# Patient Record
Sex: MALE | Race: WHITE | NOT HISPANIC OR LATINO | URBAN - METROPOLITAN AREA
[De-identification: names, ages, dates, MRNs, and addresses within clinical notes are randomized per-mention and may not be internally consistent; named-entity substitution may affect disease eponyms.]

---
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
## 2023-10-03 ENCOUNTER — NON-APPOINTMENT (OUTPATIENT)
Age: 18
End: 2023-10-03

## 2023-11-05 ENCOUNTER — NON-APPOINTMENT (OUTPATIENT)
Age: 18
End: 2023-11-05

## 2024-10-21 ENCOUNTER — NON-APPOINTMENT (OUTPATIENT)
Age: 19
End: 2024-10-21

## 2025-03-11 ENCOUNTER — INPATIENT (INPATIENT)
Facility: HOSPITAL | Age: 20
LOS: 3 days | Discharge: HOME | DRG: 143 | End: 2025-03-15
Attending: THORACIC SURGERY (CARDIOTHORACIC VASCULAR SURGERY) | Admitting: INTERNAL MEDICINE
Payer: MEDICAID

## 2025-03-11 VITALS
HEIGHT: 69 IN | RESPIRATION RATE: 20 BRPM | DIASTOLIC BLOOD PRESSURE: 74 MMHG | WEIGHT: 158.07 LBS | SYSTOLIC BLOOD PRESSURE: 120 MMHG | HEART RATE: 69 BPM | TEMPERATURE: 98 F | OXYGEN SATURATION: 97 %

## 2025-03-11 DIAGNOSIS — J93.9 PNEUMOTHORAX, UNSPECIFIED: ICD-10-CM

## 2025-03-11 DIAGNOSIS — Z98.890 OTHER SPECIFIED POSTPROCEDURAL STATES: Chronic | ICD-10-CM

## 2025-03-11 LAB
ALBUMIN SERPL ELPH-MCNC: 5.3 G/DL — HIGH (ref 3.5–5.2)
ALP SERPL-CCNC: 73 U/L — SIGNIFICANT CHANGE UP (ref 30–115)
ALT FLD-CCNC: 12 U/L — SIGNIFICANT CHANGE UP (ref 0–41)
ANION GAP SERPL CALC-SCNC: 13 MMOL/L — SIGNIFICANT CHANGE UP (ref 7–14)
APTT BLD: 32.6 SEC — SIGNIFICANT CHANGE UP (ref 27–39.2)
AST SERPL-CCNC: 17 U/L — SIGNIFICANT CHANGE UP (ref 0–41)
BILIRUB SERPL-MCNC: 0.5 MG/DL — SIGNIFICANT CHANGE UP (ref 0.2–1.2)
BUN SERPL-MCNC: 13 MG/DL — SIGNIFICANT CHANGE UP (ref 10–20)
CALCIUM SERPL-MCNC: 10.1 MG/DL — SIGNIFICANT CHANGE UP (ref 8.4–10.5)
CHLORIDE SERPL-SCNC: 103 MMOL/L — SIGNIFICANT CHANGE UP (ref 98–110)
CO2 SERPL-SCNC: 27 MMOL/L — SIGNIFICANT CHANGE UP (ref 17–32)
CREAT SERPL-MCNC: 0.9 MG/DL — SIGNIFICANT CHANGE UP (ref 0.7–1.5)
EGFR: 125 ML/MIN/1.73M2 — SIGNIFICANT CHANGE UP
EGFR: 125 ML/MIN/1.73M2 — SIGNIFICANT CHANGE UP
GLUCOSE SERPL-MCNC: 96 MG/DL — SIGNIFICANT CHANGE UP (ref 70–99)
HCT VFR BLD CALC: 46.8 % — SIGNIFICANT CHANGE UP (ref 42–52)
HGB BLD-MCNC: 15.7 G/DL — SIGNIFICANT CHANGE UP (ref 14–18)
INR BLD: 1.03 RATIO — SIGNIFICANT CHANGE UP (ref 0.65–1.3)
LIDOCAIN IGE QN: 21 U/L — SIGNIFICANT CHANGE UP (ref 7–60)
MCHC RBC-ENTMCNC: 27.9 PG — SIGNIFICANT CHANGE UP (ref 27–31)
MCHC RBC-ENTMCNC: 33.5 G/DL — SIGNIFICANT CHANGE UP (ref 32–37)
MCV RBC AUTO: 83.3 FL — SIGNIFICANT CHANGE UP (ref 80–94)
NRBC BLD AUTO-RTO: 0 /100 WBCS — SIGNIFICANT CHANGE UP (ref 0–0)
PLATELET # BLD AUTO: 239 K/UL — SIGNIFICANT CHANGE UP (ref 130–400)
PMV BLD: 10.4 FL — SIGNIFICANT CHANGE UP (ref 7.4–10.4)
POTASSIUM SERPL-MCNC: 4.2 MMOL/L — SIGNIFICANT CHANGE UP (ref 3.5–5)
POTASSIUM SERPL-SCNC: 4.2 MMOL/L — SIGNIFICANT CHANGE UP (ref 3.5–5)
PROT SERPL-MCNC: 7.7 G/DL — SIGNIFICANT CHANGE UP (ref 6–8)
PROTHROM AB SERPL-ACNC: 12.2 SEC — SIGNIFICANT CHANGE UP (ref 9.95–12.87)
RBC # BLD: 5.62 M/UL — SIGNIFICANT CHANGE UP (ref 4.7–6.1)
RBC # FLD: 13.2 % — SIGNIFICANT CHANGE UP (ref 11.5–14.5)
SODIUM SERPL-SCNC: 143 MMOL/L — SIGNIFICANT CHANGE UP (ref 135–146)
WBC # BLD: 6.44 K/UL — SIGNIFICANT CHANGE UP (ref 4.8–10.8)
WBC # FLD AUTO: 6.44 K/UL — SIGNIFICANT CHANGE UP (ref 4.8–10.8)

## 2025-03-11 PROCEDURE — 71046 X-RAY EXAM CHEST 2 VIEWS: CPT | Mod: 26

## 2025-03-11 PROCEDURE — 93005 ELECTROCARDIOGRAM TRACING: CPT

## 2025-03-11 PROCEDURE — 36415 COLL VENOUS BLD VENIPUNCTURE: CPT

## 2025-03-11 PROCEDURE — 71045 X-RAY EXAM CHEST 1 VIEW: CPT | Mod: 26

## 2025-03-11 PROCEDURE — 83735 ASSAY OF MAGNESIUM: CPT

## 2025-03-11 PROCEDURE — 86900 BLOOD TYPING SEROLOGIC ABO: CPT

## 2025-03-11 PROCEDURE — 86850 RBC ANTIBODY SCREEN: CPT

## 2025-03-11 PROCEDURE — 85730 THROMBOPLASTIN TIME PARTIAL: CPT

## 2025-03-11 PROCEDURE — 86901 BLOOD TYPING SEROLOGIC RH(D): CPT

## 2025-03-11 PROCEDURE — 80048 BASIC METABOLIC PNL TOTAL CA: CPT

## 2025-03-11 PROCEDURE — 71250 CT THORAX DX C-: CPT | Mod: MC

## 2025-03-11 PROCEDURE — 93010 ELECTROCARDIOGRAM REPORT: CPT

## 2025-03-11 PROCEDURE — 84484 ASSAY OF TROPONIN QUANT: CPT

## 2025-03-11 PROCEDURE — 86140 C-REACTIVE PROTEIN: CPT

## 2025-03-11 PROCEDURE — 80053 COMPREHEN METABOLIC PANEL: CPT

## 2025-03-11 PROCEDURE — 99291 CRITICAL CARE FIRST HOUR: CPT | Mod: 25

## 2025-03-11 PROCEDURE — 84100 ASSAY OF PHOSPHORUS: CPT

## 2025-03-11 PROCEDURE — 85652 RBC SED RATE AUTOMATED: CPT

## 2025-03-11 PROCEDURE — 85025 COMPLETE CBC W/AUTO DIFF WBC: CPT

## 2025-03-11 PROCEDURE — 85027 COMPLETE CBC AUTOMATED: CPT

## 2025-03-11 PROCEDURE — 71045 X-RAY EXAM CHEST 1 VIEW: CPT

## 2025-03-11 PROCEDURE — 85610 PROTHROMBIN TIME: CPT

## 2025-03-11 PROCEDURE — 32556 INSERT CATH PLEURA W/O IMAGE: CPT | Mod: LT

## 2025-03-11 PROCEDURE — 99222 1ST HOSP IP/OBS MODERATE 55: CPT

## 2025-03-11 RX ORDER — INFLUENZA A VIRUS A/IDAHO/07/2018 (H1N1) ANTIGEN (MDCK CELL DERIVED, PROPIOLACTONE INACTIVATED, INFLUENZA A VIRUS A/INDIANA/08/2018 (H3N2) ANTIGEN (MDCK CELL DERIVED, PROPIOLACTONE INACTIVATED), INFLUENZA B VIRUS B/SINGAPORE/INFTT-16-0610/2016 ANTIGEN (MDCK CELL DERIVED, PROPIOLACTONE INACTIVATED), INFLUENZA B VIRUS B/IOWA/06/2017 ANTIGEN (MDCK CELL DERIVED, PROPIOLACTONE INACTIVATED) 15; 15; 15; 15 UG/.5ML; UG/.5ML; UG/.5ML; UG/.5ML
0.5 INJECTION, SUSPENSION INTRAMUSCULAR ONCE
Refills: 0 | Status: DISCONTINUED | OUTPATIENT
Start: 2025-03-11 | End: 2025-03-15

## 2025-03-11 RX ORDER — IBUPROFEN 200 MG
600 TABLET ORAL ONCE
Refills: 0 | Status: COMPLETED | OUTPATIENT
Start: 2025-03-11 | End: 2025-03-11

## 2025-03-11 RX ADMIN — Medication 600 MILLIGRAM(S): at 22:48

## 2025-03-11 RX ADMIN — Medication 4 MILLIGRAM(S): at 22:48

## 2025-03-11 RX ADMIN — Medication 600 MILLIGRAM(S): at 19:02

## 2025-03-11 NOTE — ED PROVIDER NOTE - OBJECTIVE STATEMENT
19-year-old male no PMHx presents today for left-sided chest pain, left neck pain, shortness of breath on exertion since 4 PM.  Patient states he woke up from his nap with sweating and felt the symptoms.  Patient states he works out every day had a chest workout yesterday and takes 200 Mg caffeine preworkout C4.  Patient denies headache, fevers, chills, nausea, vomiting.

## 2025-03-11 NOTE — ED ADULT NURSE NOTE - CAS EDN DISCHARGE ASSESSMENT
Madonna (wife) Alert and oriented to person, place and time/Awake/Symptoms improved/No adverse reaction to first time med in ED

## 2025-03-11 NOTE — ED ADULT NURSE REASSESSMENT NOTE - NS ED NURSE REASSESS COMMENT FT1
pt upgraded from ED main for pigtail placement. iv inserted; labs sent. consent in pt chart. pt aware of plan of care.

## 2025-03-11 NOTE — ED PROVIDER NOTE - PATIENT PORTAL LINK FT
You can access the FollowMyHealth Patient Portal offered by [facility] by registering at the following website: [email or web address]. By joining [employer]’s FollowMyHealth portal, you will also be able to view your health information using other applications (apps) compatible with our system.

## 2025-03-11 NOTE — ED PROVIDER NOTE - PHYSICAL EXAMINATION
VITAL SIGNS: I have reviewed nursing notes and confirm.  CONSTITUTIONAL: well-appearing, non-toxic, NAD  SKIN: Warm dry, normal skin turgor  HEAD: NCAT  EYES: EOMI, PERRLA, no scleral icterus  ENT: Moist mucous membranes, normal pharynx with no erythema or exudates  NECK: Supple; non tender. Full ROM. No cervical LAD  CARD: RRR, no murmurs, rubs or gallops  RESP: clear to ausculation b/l.  No rales, rhonchi, or wheezing. + productive cough  ABD: soft, + BS, non-tender, non-distended, no rebound or guarding. No CVA tenderness  EXT: Full ROM, no bony tenderness, no pedal edema, no calf tenderness  NEURO: normal motor. normal sensory.  PSYCH: Cooperative, appropriate.

## 2025-03-11 NOTE — ED PROVIDER NOTE - CLINICAL SUMMARY MEDICAL DECISION MAKING FREE TEXT BOX
19-year-old presented today with left-sided chest pain.  Patient is noted to have a pneumothorax.  Patient signed out to me pending pigtail placement. Pigtail placed.  Patient's lung expanded.  Patient admitted.    Attending Statement: I have personally provided the amount of critical care time documented below excluding time spent on separate procedures.     Critical Care Time Spent (min) Must be 30 or more minutes to qualify: 35.

## 2025-03-11 NOTE — PATIENT PROFILE ADULT - FALL HARM RISK - HARM RISK INTERVENTIONS
Assistance with ambulation/Assistance OOB with selected safe patient handling equipment/Communicate Risk of Fall with Harm to all staff/Monitor gait and stability/Reinforce activity limits and safety measures with patient and family/Sit up slowly, dangle for a short time, stand at bedside before walking/Use of alarms - bed, chair and/or voice tab/Bed in lowest position, wheels locked, appropriate side rails in place/Call bell, personal items and telephone in reach/Purposeful Proactive Rounding/Room/bathroom lighting operational, light cord in reach

## 2025-03-12 LAB
ALBUMIN SERPL ELPH-MCNC: 4.6 G/DL — SIGNIFICANT CHANGE UP (ref 3.5–5.2)
ALP SERPL-CCNC: 67 U/L — SIGNIFICANT CHANGE UP (ref 30–115)
ALT FLD-CCNC: 10 U/L — SIGNIFICANT CHANGE UP (ref 0–41)
ANION GAP SERPL CALC-SCNC: 9 MMOL/L — SIGNIFICANT CHANGE UP (ref 7–14)
APTT BLD: 30.9 SEC — SIGNIFICANT CHANGE UP (ref 27–39.2)
AST SERPL-CCNC: 17 U/L — SIGNIFICANT CHANGE UP (ref 0–41)
BASOPHILS # BLD AUTO: 0.03 K/UL — SIGNIFICANT CHANGE UP (ref 0–0.2)
BASOPHILS NFR BLD AUTO: 0.5 % — SIGNIFICANT CHANGE UP (ref 0–1)
BILIRUB SERPL-MCNC: 0.3 MG/DL — SIGNIFICANT CHANGE UP (ref 0.2–1.2)
BUN SERPL-MCNC: 15 MG/DL — SIGNIFICANT CHANGE UP (ref 10–20)
CALCIUM SERPL-MCNC: 9.7 MG/DL — SIGNIFICANT CHANGE UP (ref 8.4–10.5)
CHLORIDE SERPL-SCNC: 105 MMOL/L — SIGNIFICANT CHANGE UP (ref 98–110)
CO2 SERPL-SCNC: 28 MMOL/L — SIGNIFICANT CHANGE UP (ref 17–32)
CREAT SERPL-MCNC: 1 MG/DL — SIGNIFICANT CHANGE UP (ref 0.7–1.5)
CRP SERPL-MCNC: <3 MG/L — SIGNIFICANT CHANGE UP
EGFR: 110 ML/MIN/1.73M2 — SIGNIFICANT CHANGE UP
EGFR: 110 ML/MIN/1.73M2 — SIGNIFICANT CHANGE UP
EOSINOPHIL # BLD AUTO: 0.06 K/UL — SIGNIFICANT CHANGE UP (ref 0–0.7)
EOSINOPHIL NFR BLD AUTO: 1 % — SIGNIFICANT CHANGE UP (ref 0–8)
ERYTHROCYTE [SEDIMENTATION RATE] IN BLOOD: 1 MM/HR — SIGNIFICANT CHANGE UP (ref 0–10)
GLUCOSE SERPL-MCNC: 105 MG/DL — HIGH (ref 70–99)
HCT VFR BLD CALC: 44 % — SIGNIFICANT CHANGE UP (ref 42–52)
HGB BLD-MCNC: 14.5 G/DL — SIGNIFICANT CHANGE UP (ref 14–18)
IMM GRANULOCYTES NFR BLD AUTO: 0.3 % — SIGNIFICANT CHANGE UP (ref 0.1–0.3)
INR BLD: 1.01 RATIO — SIGNIFICANT CHANGE UP (ref 0.65–1.3)
LYMPHOCYTES # BLD AUTO: 1.54 K/UL — SIGNIFICANT CHANGE UP (ref 1.2–3.4)
LYMPHOCYTES # BLD AUTO: 24.6 % — SIGNIFICANT CHANGE UP (ref 20.5–51.1)
MCHC RBC-ENTMCNC: 28 PG — SIGNIFICANT CHANGE UP (ref 27–31)
MCHC RBC-ENTMCNC: 33 G/DL — SIGNIFICANT CHANGE UP (ref 32–37)
MCV RBC AUTO: 85.1 FL — SIGNIFICANT CHANGE UP (ref 80–94)
MONOCYTES # BLD AUTO: 0.52 K/UL — SIGNIFICANT CHANGE UP (ref 0.1–0.6)
MONOCYTES NFR BLD AUTO: 8.3 % — SIGNIFICANT CHANGE UP (ref 1.7–9.3)
NEUTROPHILS # BLD AUTO: 4.1 K/UL — SIGNIFICANT CHANGE UP (ref 1.4–6.5)
NEUTROPHILS NFR BLD AUTO: 65.3 % — SIGNIFICANT CHANGE UP (ref 42.2–75.2)
NRBC BLD AUTO-RTO: 0 /100 WBCS — SIGNIFICANT CHANGE UP (ref 0–0)
PLATELET # BLD AUTO: 206 K/UL — SIGNIFICANT CHANGE UP (ref 130–400)
PMV BLD: 10.4 FL — SIGNIFICANT CHANGE UP (ref 7.4–10.4)
POTASSIUM SERPL-MCNC: 3.9 MMOL/L — SIGNIFICANT CHANGE UP (ref 3.5–5)
POTASSIUM SERPL-SCNC: 3.9 MMOL/L — SIGNIFICANT CHANGE UP (ref 3.5–5)
PROT SERPL-MCNC: 6.8 G/DL — SIGNIFICANT CHANGE UP (ref 6–8)
PROTHROM AB SERPL-ACNC: 11.9 SEC — SIGNIFICANT CHANGE UP (ref 9.95–12.87)
RBC # BLD: 5.17 M/UL — SIGNIFICANT CHANGE UP (ref 4.7–6.1)
RBC # FLD: 13.2 % — SIGNIFICANT CHANGE UP (ref 11.5–14.5)
SODIUM SERPL-SCNC: 142 MMOL/L — SIGNIFICANT CHANGE UP (ref 135–146)
TROPONIN T, HIGH SENSITIVITY RESULT: <6 NG/L — SIGNIFICANT CHANGE UP (ref 6–21)
WBC # BLD: 6.27 K/UL — SIGNIFICANT CHANGE UP (ref 4.8–10.8)
WBC # FLD AUTO: 6.27 K/UL — SIGNIFICANT CHANGE UP (ref 4.8–10.8)

## 2025-03-12 PROCEDURE — 71045 X-RAY EXAM CHEST 1 VIEW: CPT | Mod: 26

## 2025-03-12 PROCEDURE — 93010 ELECTROCARDIOGRAM REPORT: CPT

## 2025-03-12 PROCEDURE — 71250 CT THORAX DX C-: CPT | Mod: 26

## 2025-03-12 RX ORDER — KETOROLAC TROMETHAMINE 30 MG/ML
15 INJECTION, SOLUTION INTRAMUSCULAR; INTRAVENOUS ONCE
Refills: 0 | Status: DISCONTINUED | OUTPATIENT
Start: 2025-03-12 | End: 2025-03-12

## 2025-03-12 RX ORDER — IBUPROFEN 200 MG
400 TABLET ORAL EVERY 6 HOURS
Refills: 0 | Status: DISCONTINUED | OUTPATIENT
Start: 2025-03-12 | End: 2025-03-13

## 2025-03-12 RX ORDER — ONDANSETRON HCL/PF 4 MG/2 ML
4 VIAL (ML) INJECTION EVERY 8 HOURS
Refills: 0 | Status: DISCONTINUED | OUTPATIENT
Start: 2025-03-12 | End: 2025-03-15

## 2025-03-12 RX ORDER — MELATONIN 5 MG
3 TABLET ORAL AT BEDTIME
Refills: 0 | Status: DISCONTINUED | OUTPATIENT
Start: 2025-03-12 | End: 2025-03-15

## 2025-03-12 RX ORDER — MAGNESIUM, ALUMINUM HYDROXIDE 200-200 MG
30 TABLET,CHEWABLE ORAL EVERY 4 HOURS
Refills: 0 | Status: DISCONTINUED | OUTPATIENT
Start: 2025-03-12 | End: 2025-03-15

## 2025-03-12 RX ORDER — ACETAMINOPHEN 500 MG/5ML
650 LIQUID (ML) ORAL EVERY 6 HOURS
Refills: 0 | Status: DISCONTINUED | OUTPATIENT
Start: 2025-03-12 | End: 2025-03-15

## 2025-03-12 RX ADMIN — Medication 400 MILLIGRAM(S): at 22:16

## 2025-03-12 RX ADMIN — KETOROLAC TROMETHAMINE 15 MILLIGRAM(S): 30 INJECTION, SOLUTION INTRAMUSCULAR; INTRAVENOUS at 10:49

## 2025-03-12 RX ADMIN — Medication 650 MILLIGRAM(S): at 20:26

## 2025-03-12 RX ADMIN — Medication 650 MILLIGRAM(S): at 20:56

## 2025-03-12 RX ADMIN — KETOROLAC TROMETHAMINE 15 MILLIGRAM(S): 30 INJECTION, SOLUTION INTRAMUSCULAR; INTRAVENOUS at 05:40

## 2025-03-12 RX ADMIN — Medication 400 MILLIGRAM(S): at 21:46

## 2025-03-12 RX ADMIN — KETOROLAC TROMETHAMINE 15 MILLIGRAM(S): 30 INJECTION, SOLUTION INTRAMUSCULAR; INTRAVENOUS at 05:10

## 2025-03-12 RX ADMIN — KETOROLAC TROMETHAMINE 15 MILLIGRAM(S): 30 INJECTION, SOLUTION INTRAMUSCULAR; INTRAVENOUS at 11:19

## 2025-03-12 NOTE — H&P ADULT - NSHPLABSRESULTS_GEN_ALL_CORE
15.7   6.44  )-----------( 239      ( 11 Mar 2025 20:00 )             46.8       03-11    143  |  103  |  13  ----------------------------<  96  4.2   |  27  |  0.9    Ca    10.1      11 Mar 2025 20:00    TPro  7.7  /  Alb  5.3[H]  /  TBili  0.5  /  DBili  x   /  AST  17  /  ALT  12  /  AlkPhos  73  03-11              Urinalysis Basic - ( 11 Mar 2025 20:00 )    Color: x / Appearance: x / SG: x / pH: x  Gluc: 96 mg/dL / Ketone: x  / Bili: x / Urobili: x   Blood: x / Protein: x / Nitrite: x   Leuk Esterase: x / RBC: x / WBC x   Sq Epi: x / Non Sq Epi: x / Bacteria: x        PT/INR - ( 11 Mar 2025 22:09 )   PT: 12.20 sec;   INR: 1.03 ratio         PTT - ( 11 Mar 2025 20:00 )  PTT:32.6 sec    Lactate Trend            CAPILLARY BLOOD GLUCOSE 15.7   6.44  )-----------( 239      ( 11 Mar 2025 20:00 )             46.8       03-11    143  |  103  |  13  ----------------------------<  96  4.2   |  27  |  0.9    Ca    10.1      11 Mar 2025 20:00    TPro  7.7  /  Alb  5.3[H]  /  TBili  0.5  /  DBili  x   /  AST  17  /  ALT  12  /  AlkPhos  73  03-11              Urinalysis Basic - ( 11 Mar 2025 20:00 )    Color: x / Appearance: x / SG: x / pH: x  Gluc: 96 mg/dL / Ketone: x  / Bili: x / Urobili: x   Blood: x / Protein: x / Nitrite: x   Leuk Esterase: x / RBC: x / WBC x   Sq Epi: x / Non Sq Epi: x / Bacteria: x        PT/INR - ( 11 Mar 2025 22:09 )   PT: 12.20 sec;   INR: 1.03 ratio         PTT - ( 11 Mar 2025 20:00 )  PTT:32.6 sec    Lactate Trend      CAPILLARY BLOOD GLUCOSE      RADIOLOGY:  < from: Xray Chest 2 Views PA/Lat (03.11.25 @ 18:06) >    Moderate to large left-sided pneumothorax. Rightward mediastinal shift.    < end of copied text >

## 2025-03-12 NOTE — H&P ADULT - NSHPPHYSICALEXAM_GEN_ALL_CORE
PHYSICAL EXAM:  GENERAL: NAD, well-groomed, well-developed  HEAD:  Atraumatic, Normocephalic  EYES: EOMI, PERRLA, conjunctiva and sclera clear  ENMT: No tonsillar erythema, exudates, or enlargement; Moist mucous membranes  NECK: Supple, No JVD, Normal thyroid  HEART: Regular rate and rhythm; No murmurs, rubs, or gallops  RESPIRATORY: CTA B/L, No W/R/R, Left sided chest tube  ABDOMEN: Soft, Nontender, Nondistended; Bowel sounds present  NEUROLOGY: A&Ox3, nonfocal, moving all extremities  EXTREMITIES:  2+ Peripheral Pulses, No clubbing, cyanosis, or edema  SKIN: warm, dry, normal color, no rash or abnormal lesions PHYSICAL EXAM:  GENERAL: NAD, well-groomed, well-developed  HEAD:  Atraumatic, Normocephalic  EYES: EOMI, PERRLA, conjunctiva and sclera clear  HEART: Regular rate and rhythm; No murmurs, rubs, or gallops  RESPIRATORY: CTA B/L, No W/R/R, Left sided chest tube  ABDOMEN: Soft, Nontender, Nondistended; Bowel sounds present  NEUROLOGY: A&Ox3, nonfocal, moving all extremities  EXTREMITIES:  2+ Peripheral Pulses, No clubbing, cyanosis, or edema  SKIN: warm, dry, normal color, no rash or abnormal lesions

## 2025-03-12 NOTE — H&P ADULT - ASSESSMENT
This is a 19-year-old male with no PMHx, presents today for left-sided chest pain, left neck pain, shortness of breath on exertion since 4 PM.  Patient states he woke up from his nap with sweating and felt the symptoms.  Patient states he works out every day, and  had his routine chest workout yesterday, he reported ebonie the takes 200 Mg caffeine preworkout C4.  Patient denies headache, fevers, chills, nausea, vomiting.    Non-traumatic new onset L pneumothorax  Chest pain  - CXR showed left- sided pneumothorax, S/p Pigtail placement in ED.  - O2 saturation 100% on RA.  - Denies any recent trauma, denies any family history of connective tissue disease or lung cysts.  - ECG on admission shows increased QRS voltage and right axis, could be explained by the pneumothorax. Will repeat ECG in AM.  - Will repeat CXR in AM, Will need daily CXRs to monitor pneumothorax.  - Will get CT chest to assess for lung cysts.  - May benefit from outpatient testing for possible underlying genetic etiology, and or possible evaluation for VATS if needed.    #MISC  - DVT PPx: not indicated  - GI PPx: not indicated  - Diet: regular  - Activity: activity as tolerated  - Code: Full  - Dispo: medicine  - Medrec: patient takes no standing meds         This is a 19-year-old male with no PMHx, presents today for left-sided chest pain, left neck pain, shortness of breath on exertion since 4 PM.  Patient states he woke up from his nap with sweating and felt the symptoms.  Patient states he works out every day, and  had his routine chest workout yesterday, he reported ebonie the takes 200 Mg caffeine preworkout C4.  Patient denies headache, fevers, chills, nausea, vomiting.    Non-traumatic new onset L pneumothorax  Chest pain  - CXR showed left- sided pneumothorax, S/p Pigtail placement in ED.  - O2 saturation 100% on RA.  - Denies any recent trauma, denies any family history of connective tissue disease or lung cysts.  - ECG on admission shows increased QRS voltage and right axis, could be explained by the pneumothorax. Will repeat ECG in AM.  - Will repeat CXR in AM, Will need daily CXRs to monitor pneumothorax.  - Will get CT chest to assess for lung cysts.  - Pulm consult for chest tube management.  - May benefit from outpatient testing for possible underlying genetic etiology, and or possible evaluation for VATS if needed.    #MISC  - DVT PPx: not indicated  - GI PPx: not indicated  - Diet: regular  - Activity: activity as tolerated  - Code: Full  - Dispo: medicine  - Medrec: patient takes no standing meds         This is a 19-year-old male with no PMHx, presents today for left-sided chest pain, left neck pain, shortness of breath on exertion since 4 PM.  Patient states he woke up from his nap with sweating and felt the symptoms.  Patient states he works out every day, and  had his routine chest workout yesterday, he reported ebonie the takes 200 Mg caffeine preworkout C4.  Patient denies headache, fevers, chills, nausea, vomiting.    #Non-traumatic new onset L pneumothorax  #Chest pain  - CXR showed left- sided pneumothorax, S/p Pigtail placement in ED.  - O2 saturation 100% on RA.  - Denies any recent trauma, denies any family history of connective tissue disease or lung cysts.  - ECG on admission shows increased QRS voltage and right axis, could be explained by the pneumothorax. Will repeat ECG in AM.  - Will repeat CXR in AM, Will need daily CXRs to monitor pneumothorax.  - Will get CT chest to screen for lung cysts.  - Pulm consult for chest tube management.  - May benefit from outpatient testing for possible underlying genetic etiology, and or possible evaluation for VATS if needed.    #MISC  - DVT PPx: not indicated  - GI PPx: not indicated  - Diet: regular  - Activity: activity as tolerated  - Code: Full  - Dispo: medicine  - Medrec: patient takes no standing meds         This is a 19-year-old male with no PMHx, presents today for left-sided chest pain, left neck pain, shortness of breath on exertion since 4 PM.  Patient states he woke up from his nap with sweating and felt the symptoms.  Patient states he works out every day, and  had his routine chest workout yesterday, he reported ebonie the takes 200 Mg caffeine preworkout C4.  Patient denies headache, fevers, chills, nausea, vomiting.    #Non-traumatic new onset L pneumothorax  #Chest pain  - CXR showed left- sided pneumothorax, S/p Pigtail placement in ED.  - O2 saturation 100% on RA.  - Denies any recent trauma, denies any family history of connective tissue disease or lung cysts.  - ECG on admission shows increased QRS voltage and right axis, could be explained by the pneumothorax. Will repeat ECG in AM.  - Will repeat CXR in AM, Will need daily CXRs to monitor pneumothorax.  - Will get CT chest to screen for lung cysts/blebs.  - Pulm consult for chest tube management.  - May benefit from outpatient testing for possible underlying genetic etiology, and or possible evaluation for VATS if needed.    #MISC  - DVT PPx: not indicated  - GI PPx: not indicated  - Diet: regular  - Activity: activity as tolerated  - Code: Full  - Dispo: medicine  - Medrec: patient takes no standing meds

## 2025-03-12 NOTE — CONSULT NOTE ADULT - SUBJECTIVE AND OBJECTIVE BOX
GENERAL SURGERY CONSULT NOTE    Patient: RUPINDER JIMENEZ , 19y ([birth year])Male   MRN: [id number]  Location: [address]  Visit: 03-11-25 Inpatient  Date: 03-12-25 @ 16:51    HPI:  Pt is 19M no reported PMHx who presented to the ED on 3/11 with sudden-onset L-sided chest pain, found to have spontaneous L pneumothorax in ED, s/p L-sided pigtail placement by ED. Thoracic surgery consulted for pigtail management while admitted. Pt denies fevers, chills, current CP, SOB, N/V, or pain at pigtail insertion site. CT chest demonstrated pigtail to chest wall periphery, no appreciable blebs, pending formal read.     PAST MEDICAL & SURGICAL HISTORY:  Suppurative otitis media of right ear, unspecified chronicity  PREV EUSTACHIAN TUBE  History of surgery  TONSILLECTOMY  EUSTACHIAN TUBE    VITALS:  T(F): 97.2 (03-12-25 @ 12:00), Max: 98.2 (03-11-25 @ 23:30)  HR: 78 (03-12-25 @ 12:00) (63 - 100)  BP: 136/71 (03-12-25 @ 12:00) (113/64 - 149/67)  RR: 18 (03-12-25 @ 12:00) (17 - 20)  SpO2: 97% (03-12-25 @ 12:00) (96% - 100%)    PHYSICAL EXAM:  GENERAL: A&Ox3, NAD  HEENT: Normocephalic, atraumatic  PULM: Non-labored respirations, bilateral chest rise, saturating well on RA, bilateral breath sounds, L pigtail to suction, -AL, no crepitus around insertion site, site is non-erythematous without induration or fluctuance   CV: Regular rate and rhythm  MSK: Moving extremities spontaneously, BUE and BLE sensorimotor and strength intact  NEURO: No focal deficits  SKIN: Warm, dry, normal skin color, texture, turgor    MEDICATIONS  (STANDING):  influenza   Vaccine 0.5 milliLiter(s) IntraMuscular once    MEDICATIONS  (PRN):  acetaminophen     Tablet .. 650 milliGRAM(s) Oral every 6 hours PRN Temp greater or equal to 38C (100.4F), Mild Pain (1 - 3)  aluminum hydroxide/magnesium hydroxide/simethicone Suspension 30 milliLiter(s) Oral every 4 hours PRN Dyspepsia  melatonin 3 milliGRAM(s) Oral at bedtime PRN Insomnia  ondansetron Injectable 4 milliGRAM(s) IV Push every 8 hours PRN Nausea and/or Vomiting    LAB/STUDIES:                        14.5   6.27  )-----------( 206      ( 12 Mar 2025 07:21 )             44.0     03-12    142  |  105  |  15  ----------------------------<  105[H]  3.9   |  28  |  1.0    Ca    9.7      12 Mar 2025 07:21    TPro  6.8  /  Alb  4.6  /  TBili  0.3  /  DBili  x   /  AST  17  /  ALT  10  /  AlkPhos  67  03-12    PT/INR - ( 12 Mar 2025 07:21 )   PT: 11.90 sec;   INR: 1.01 ratio       PTT - ( 12 Mar 2025 07:21 )  PTT:30.9 sec  LIVER FUNCTIONS - ( 12 Mar 2025 07:21 )  Alb: 4.6 g/dL / Pro: 6.8 g/dL / ALK PHOS: 67 U/L / ALT: 10 U/L / AST: 17 U/L / GGT: x           Urinalysis Basic - ( 12 Mar 2025 07:21 )    Color: x / Appearance: x / SG: x / pH: x  Gluc: 105 mg/dL / Ketone: x  / Bili: x / Urobili: x   Blood: x / Protein: x / Nitrite: x   Leuk Esterase: x / RBC: x / WBC x   Sq Epi: x / Non Sq Epi: x / Bacteria: x    IMAGING:  < from: Xray Chest 1 View- PORTABLE-Urgent (Xray Chest 1 View- PORTABLE-Urgent .) (03.11.25 @ 21:38) >  ACC: [id number] EXAM:  XR CHEST PORTABLE URGENT 1V   ORDERED BY: MINAL AUSTIN     PROCEDURE DATE:  03/11/2025          INTERPRETATION:  EXAMINATION: XR CHEST URGENT    CLINICAL INFORMATION: post pigtail.    TECHNIQUE: A frontal view of the chest was obtained.    COMPARISON: Chest x-ray 3/11/2025 at 6:08 PM    FINDINGS: A left pleural pigtail catheter has been placed. There is trace   residual left pneumothorax. There is subcutaneous emphysema along left   lateral chest wall. The cardiomediastinal silhouette is normal in width   and contour. There is no pleural effusion    IMPRESSION: Trace residual left pneumothorax status post pigtail catheter   placement. Subcutaneous emphysema left lateral chest wall    < end of copied text >      ASSESSMENT:  19M no reported PMHx who presented to the ED on 3/11 with sudden-onset L-sided chest pain, found to have spontaneous L pneumothorax in ED, s/p L-sided pigtail placement by ED. Thoracic surgery consulted for pigtail management while admitted.    PLAN:  - Pending final discussion with attending

## 2025-03-12 NOTE — H&P ADULT - HISTORY OF PRESENT ILLNESS
This is a 19-year-old male with no PMHx, presents today for left-sided chest pain, left neck pain, shortness of breath on exertion since 4 PM.  Patient states he woke up from his nap with sweating and felt the symptoms.  Patient states he works out every day, and  had his routine chest workout yesterday, he reported ebonie the takes 200 Mg caffeine preworkout C4.  Patient denies headache, fevers, chills, nausea, vomiting.    Vitals in ED were WNL.    CXR showed left sided pneumothorax, Pigtail was placed in ED.  Labs were unremarkable.    Patient was admitted to medicine for further evaluation. This is a 19-year-old male with no PMHx, presents today for left-sided chest pain, left neck pain, shortness of breath on exertion since 4 PM.  Patient states he woke up from his nap with sweating and felt the symptoms.  Patient states he works out every day, and  had his routine chest workout yesterday, he reported that he takes 200 Mg caffeine preworkout C4.  Patient denies headache, fevers, chills, nausea, vomiting.    Vitals in ED were WNL.    CXR showed left sided pneumothorax, Pigtail was placed in ED.  Labs were unremarkable.    Patient was admitted to medicine for further evaluation.

## 2025-03-12 NOTE — H&P ADULT - ATTENDING COMMENTS
Patient seen on 03/11/25.      This is a 19-year-old male with no PMHx, presents today for left-sided chest pain, left neck pain, shortness of breath on exertion since 4 PM.  Patient states he woke up from his nap with sweating and felt the symptoms.  Patient states he works out every day, and  had his routine chest workout yesterday, he reported ebonie the takes 200 Mg caffeine preworkout C4.  Patient denies headache, fevers, chills, nausea, vomiting.    #Non-traumatic new onset L pneumothorax  #Chest pain  - CXR showed left- sided pneumothorax, S/p Pigtail placement in ED.  - O2 saturation 100% on RA.  - Denies any recent trauma, denies any family history of connective tissue disease or lung cysts.  - ECG on admission shows increased QRS voltage and right axis, could be explained by the pneumothorax. Will repeat ECG in AM.  - Will repeat CXR in AM, Will need daily CXRs to monitor pneumothorax.  - Will get CT chest to screen for lung cysts.  - Pulm consult for chest tube management.  - May benefit from outpatient testing for possible underlying genetic etiology, and or possible evaluation for VATS if needed.    #MISC  - DVT PPx: not indicated  - GI PPx: not indicated  - Diet: regular  - Activity: activity as tolerated  - Code: Full  - Dispo: medicine  - Medrec: patient takes no standing meds

## 2025-03-13 PROCEDURE — 99232 SBSQ HOSP IP/OBS MODERATE 35: CPT

## 2025-03-13 PROCEDURE — 71045 X-RAY EXAM CHEST 1 VIEW: CPT | Mod: 26,76

## 2025-03-13 RX ORDER — ENOXAPARIN SODIUM 100 MG/ML
40 INJECTION SUBCUTANEOUS EVERY 24 HOURS
Refills: 0 | Status: DISCONTINUED | OUTPATIENT
Start: 2025-03-13 | End: 2025-03-15

## 2025-03-13 RX ORDER — KETOROLAC TROMETHAMINE 30 MG/ML
30 INJECTION, SOLUTION INTRAMUSCULAR; INTRAVENOUS ONCE
Refills: 0 | Status: DISCONTINUED | OUTPATIENT
Start: 2025-03-13 | End: 2025-03-13

## 2025-03-13 RX ORDER — KETOROLAC TROMETHAMINE 30 MG/ML
15 INJECTION, SOLUTION INTRAMUSCULAR; INTRAVENOUS EVERY 6 HOURS
Refills: 0 | Status: DISCONTINUED | OUTPATIENT
Start: 2025-03-13 | End: 2025-03-15

## 2025-03-13 RX ADMIN — ENOXAPARIN SODIUM 40 MILLIGRAM(S): 100 INJECTION SUBCUTANEOUS at 11:57

## 2025-03-13 RX ADMIN — KETOROLAC TROMETHAMINE 15 MILLIGRAM(S): 30 INJECTION, SOLUTION INTRAMUSCULAR; INTRAVENOUS at 19:03

## 2025-03-13 RX ADMIN — Medication 400 MILLIGRAM(S): at 05:54

## 2025-03-13 RX ADMIN — KETOROLAC TROMETHAMINE 15 MILLIGRAM(S): 30 INJECTION, SOLUTION INTRAMUSCULAR; INTRAVENOUS at 11:57

## 2025-03-13 RX ADMIN — KETOROLAC TROMETHAMINE 15 MILLIGRAM(S): 30 INJECTION, SOLUTION INTRAMUSCULAR; INTRAVENOUS at 18:33

## 2025-03-13 RX ADMIN — KETOROLAC TROMETHAMINE 15 MILLIGRAM(S): 30 INJECTION, SOLUTION INTRAMUSCULAR; INTRAVENOUS at 12:27

## 2025-03-13 RX ADMIN — Medication 400 MILLIGRAM(S): at 06:24

## 2025-03-13 NOTE — PROGRESS NOTE ADULT - ATTENDING COMMENTS
Pt seen and examined. Case and Plan discussed at rounds and agree with above documentation.   Pt is s/p Pig Tail replacement by CT Sx Team this morning.   CXR repeated with improvement 3mm   CT Chest showing a bleb 1.2cm on the left side and Pneumothorax 3.1 cm   c/w current care: Incentive Spirometer / DVT proph / Toradol 30mg x1 now and 15mg IV q6/prn   daily CXR  daily CT surgery follow up for management of the pneumothorax   Pt denies smoking except occational with friends     Pending: Chest Tube management and resolution of Pneumothorax  Dispo: Acute

## 2025-03-14 LAB
ANION GAP SERPL CALC-SCNC: 8 MMOL/L — SIGNIFICANT CHANGE UP (ref 7–14)
ANION GAP SERPL CALC-SCNC: 8 MMOL/L — SIGNIFICANT CHANGE UP (ref 7–14)
APTT BLD: 31.7 SEC — SIGNIFICANT CHANGE UP (ref 27–39.2)
APTT BLD: 35.1 SEC — SIGNIFICANT CHANGE UP (ref 27–39.2)
BASOPHILS # BLD AUTO: 0.04 K/UL — SIGNIFICANT CHANGE UP (ref 0–0.2)
BASOPHILS NFR BLD AUTO: 0.6 % — SIGNIFICANT CHANGE UP (ref 0–1)
BUN SERPL-MCNC: 19 MG/DL — SIGNIFICANT CHANGE UP (ref 10–20)
BUN SERPL-MCNC: 20 MG/DL — SIGNIFICANT CHANGE UP (ref 10–20)
CALCIUM SERPL-MCNC: 9.7 MG/DL — SIGNIFICANT CHANGE UP (ref 8.4–10.5)
CALCIUM SERPL-MCNC: 9.8 MG/DL — SIGNIFICANT CHANGE UP (ref 8.4–10.5)
CHLORIDE SERPL-SCNC: 105 MMOL/L — SIGNIFICANT CHANGE UP (ref 98–110)
CHLORIDE SERPL-SCNC: 105 MMOL/L — SIGNIFICANT CHANGE UP (ref 98–110)
CO2 SERPL-SCNC: 28 MMOL/L — SIGNIFICANT CHANGE UP (ref 17–32)
CO2 SERPL-SCNC: 28 MMOL/L — SIGNIFICANT CHANGE UP (ref 17–32)
CREAT SERPL-MCNC: 0.9 MG/DL — SIGNIFICANT CHANGE UP (ref 0.7–1.5)
CREAT SERPL-MCNC: 1 MG/DL — SIGNIFICANT CHANGE UP (ref 0.7–1.5)
EGFR: 110 ML/MIN/1.73M2 — SIGNIFICANT CHANGE UP
EGFR: 110 ML/MIN/1.73M2 — SIGNIFICANT CHANGE UP
EGFR: 125 ML/MIN/1.73M2 — SIGNIFICANT CHANGE UP
EGFR: 125 ML/MIN/1.73M2 — SIGNIFICANT CHANGE UP
EOSINOPHIL # BLD AUTO: 0.22 K/UL — SIGNIFICANT CHANGE UP (ref 0–0.7)
EOSINOPHIL NFR BLD AUTO: 3.4 % — SIGNIFICANT CHANGE UP (ref 0–8)
GLUCOSE SERPL-MCNC: 92 MG/DL — SIGNIFICANT CHANGE UP (ref 70–99)
GLUCOSE SERPL-MCNC: 92 MG/DL — SIGNIFICANT CHANGE UP (ref 70–99)
HCT VFR BLD CALC: 41.2 % — LOW (ref 42–52)
HCT VFR BLD CALC: 43.8 % — SIGNIFICANT CHANGE UP (ref 42–52)
HGB BLD-MCNC: 13.8 G/DL — LOW (ref 14–18)
HGB BLD-MCNC: 14.3 G/DL — SIGNIFICANT CHANGE UP (ref 14–18)
IMM GRANULOCYTES NFR BLD AUTO: 0.2 % — SIGNIFICANT CHANGE UP (ref 0.1–0.3)
INR BLD: 0.96 RATIO — SIGNIFICANT CHANGE UP (ref 0.65–1.3)
INR BLD: 0.98 RATIO — SIGNIFICANT CHANGE UP (ref 0.65–1.3)
LYMPHOCYTES # BLD AUTO: 2.16 K/UL — SIGNIFICANT CHANGE UP (ref 1.2–3.4)
LYMPHOCYTES # BLD AUTO: 33 % — SIGNIFICANT CHANGE UP (ref 20.5–51.1)
MAGNESIUM SERPL-MCNC: 2 MG/DL — SIGNIFICANT CHANGE UP (ref 1.8–2.4)
MAGNESIUM SERPL-MCNC: 2 MG/DL — SIGNIFICANT CHANGE UP (ref 1.8–2.4)
MCHC RBC-ENTMCNC: 28 PG — SIGNIFICANT CHANGE UP (ref 27–31)
MCHC RBC-ENTMCNC: 28 PG — SIGNIFICANT CHANGE UP (ref 27–31)
MCHC RBC-ENTMCNC: 32.6 G/DL — SIGNIFICANT CHANGE UP (ref 32–37)
MCHC RBC-ENTMCNC: 33.5 G/DL — SIGNIFICANT CHANGE UP (ref 32–37)
MCV RBC AUTO: 83.6 FL — SIGNIFICANT CHANGE UP (ref 80–94)
MCV RBC AUTO: 85.7 FL — SIGNIFICANT CHANGE UP (ref 80–94)
MONOCYTES # BLD AUTO: 0.4 K/UL — SIGNIFICANT CHANGE UP (ref 0.1–0.6)
MONOCYTES NFR BLD AUTO: 6.1 % — SIGNIFICANT CHANGE UP (ref 1.7–9.3)
NEUTROPHILS # BLD AUTO: 3.72 K/UL — SIGNIFICANT CHANGE UP (ref 1.4–6.5)
NEUTROPHILS NFR BLD AUTO: 56.7 % — SIGNIFICANT CHANGE UP (ref 42.2–75.2)
NRBC BLD AUTO-RTO: 0 /100 WBCS — SIGNIFICANT CHANGE UP (ref 0–0)
NRBC BLD AUTO-RTO: 0 /100 WBCS — SIGNIFICANT CHANGE UP (ref 0–0)
PHOSPHATE SERPL-MCNC: 3.8 MG/DL — SIGNIFICANT CHANGE UP (ref 2.1–4.9)
PHOSPHATE SERPL-MCNC: 4.5 MG/DL — SIGNIFICANT CHANGE UP (ref 2.1–4.9)
PLATELET # BLD AUTO: 193 K/UL — SIGNIFICANT CHANGE UP (ref 130–400)
PLATELET # BLD AUTO: 206 K/UL — SIGNIFICANT CHANGE UP (ref 130–400)
PMV BLD: 10 FL — SIGNIFICANT CHANGE UP (ref 7.4–10.4)
PMV BLD: 10.4 FL — SIGNIFICANT CHANGE UP (ref 7.4–10.4)
POTASSIUM SERPL-MCNC: 4.1 MMOL/L — SIGNIFICANT CHANGE UP (ref 3.5–5)
POTASSIUM SERPL-MCNC: 4.1 MMOL/L — SIGNIFICANT CHANGE UP (ref 3.5–5)
POTASSIUM SERPL-SCNC: 4.1 MMOL/L — SIGNIFICANT CHANGE UP (ref 3.5–5)
POTASSIUM SERPL-SCNC: 4.1 MMOL/L — SIGNIFICANT CHANGE UP (ref 3.5–5)
PROTHROM AB SERPL-ACNC: 11.3 SEC — SIGNIFICANT CHANGE UP (ref 9.95–12.87)
PROTHROM AB SERPL-ACNC: 11.6 SEC — SIGNIFICANT CHANGE UP (ref 9.95–12.87)
RBC # BLD: 4.93 M/UL — SIGNIFICANT CHANGE UP (ref 4.7–6.1)
RBC # BLD: 5.11 M/UL — SIGNIFICANT CHANGE UP (ref 4.7–6.1)
RBC # FLD: 13.1 % — SIGNIFICANT CHANGE UP (ref 11.5–14.5)
RBC # FLD: 13.2 % — SIGNIFICANT CHANGE UP (ref 11.5–14.5)
SODIUM SERPL-SCNC: 141 MMOL/L — SIGNIFICANT CHANGE UP (ref 135–146)
SODIUM SERPL-SCNC: 141 MMOL/L — SIGNIFICANT CHANGE UP (ref 135–146)
WBC # BLD: 6.55 K/UL — SIGNIFICANT CHANGE UP (ref 4.8–10.8)
WBC # BLD: 6.95 K/UL — SIGNIFICANT CHANGE UP (ref 4.8–10.8)
WBC # FLD AUTO: 6.55 K/UL — SIGNIFICANT CHANGE UP (ref 4.8–10.8)
WBC # FLD AUTO: 6.95 K/UL — SIGNIFICANT CHANGE UP (ref 4.8–10.8)

## 2025-03-14 PROCEDURE — 99232 SBSQ HOSP IP/OBS MODERATE 35: CPT

## 2025-03-14 PROCEDURE — 71045 X-RAY EXAM CHEST 1 VIEW: CPT | Mod: 26

## 2025-03-14 RX ORDER — SODIUM CHLORIDE 9 G/1000ML
1000 INJECTION, SOLUTION INTRAVENOUS
Refills: 0 | Status: DISCONTINUED | OUTPATIENT
Start: 2025-03-15 | End: 2025-03-15

## 2025-03-14 RX ORDER — SODIUM CHLORIDE 9 G/1000ML
1000 INJECTION, SOLUTION INTRAVENOUS
Refills: 0 | Status: DISCONTINUED | OUTPATIENT
Start: 2025-03-14 | End: 2025-03-14

## 2025-03-14 RX ADMIN — KETOROLAC TROMETHAMINE 15 MILLIGRAM(S): 30 INJECTION, SOLUTION INTRAMUSCULAR; INTRAVENOUS at 04:00

## 2025-03-14 RX ADMIN — KETOROLAC TROMETHAMINE 15 MILLIGRAM(S): 30 INJECTION, SOLUTION INTRAMUSCULAR; INTRAVENOUS at 03:41

## 2025-03-14 RX ADMIN — ENOXAPARIN SODIUM 40 MILLIGRAM(S): 100 INJECTION SUBCUTANEOUS at 11:18

## 2025-03-14 NOTE — PROGRESS NOTE ADULT - ASSESSMENT
ASSESSMENT:  19y M w/  no PMHx, presented for left-sided chest pain, left neck pain, shortness of breath on exertion found to have large left-sided pneumothorax with rightward mediastinal shift, s/p pigtail placement by ED,     PLAN:  pigtail to suction  Daily AM cxr  monitor pigtail output  monitor for airleak  monitor O2 sat  incentive spirometry  pain control  SCDs  DVT/GI prophylaxis    spectra [id number]
  ASSESSMENT:  19y M w/  no PMHx, presented for left-sided chest pain, left neck pain, shortness of breath on exertion found to have large left-sided pneumothorax with rightward mediastinal shift, s/p pigtail placement by ED,     PLAN:  - Pigtail removed, follow up post-pull cxr   - Follow up discussion for surgery  -  Monitor O2 sat  - Incentive spirometry  - SCDs  - DVT/GI prophylaxis  - Dispo    GREEN TEAM SPECTRA: [id number]
This is a 19-year-old male with no PMHx, presents today for left-sided chest pain, left neck pain, shortness of breath on exertion since 4 PM.  Patient states he woke up from his nap with sweating and felt the symptoms.  Patient states he works out every day, and  had his routine chest workout yesterday, he reported ebonie the takes 200 Mg caffeine preworkout C4.  Patient denies headache, fevers, chills, nausea, vomiting.    #Spontaneous left pneumothorax  - CXR showed left- sided pneumothorax 3.1cm, S/p Pigtail placement in ED.  - Hemodynamically stable, Sat 100% on RA  - Denies any recent trauma, denies any family history of connective tissue disease or lung cysts.  - CT chest: bullae 1.2cm in left apical lung  - Advised strongly on smoking cessation  - CTSx consulted: chest tube replaced today, daily CXR, called them and cleared for lovenox use  - CXR in AM: Left residual PTX 3mm  - Incentive spirometer  - Pain management: tylenol q6h PRN, toradol 15mg q6h PRN  - FU CXR in AM    DVT ppx: lovenox  GI ppx: not indicated  Diet: regular  Activity: activity as tolerated  Dispo: med      
This is a 19-year-old male with no PMHx, presents today for left-sided chest pain, left neck pain, shortness of breath on exertion since 4 PM.  Patient states he woke up from his nap with sweating and felt the symptoms.  Patient states he works out every day, and  had his routine chest workout yesterday, he reported ebonie the takes 200 Mg caffeine preworkout C4.  Patient denies headache, fevers, chills, nausea, vomiting.    #Spontaneous left pneumothorax  - CXR showed left- sided pneumothorax 3.1cm, S/p Pigtail placement in ED.  - Hemodynamically stable, Sat 100% on RA  - Denies any recent trauma, denies any family history of connective tissue disease or lung cysts.  - CT chest: bullae 1.2cm in left apical lung  - Advised strongly on smoking cessation  - CTSx : chest tube removed today  - Incentive spirometer  - Pain management: tylenol q6h PRN, toradol 15mg q6h PRN  plan for surgical intervention - blebectomy, pleurectomy, VATS      DVT ppx: lovenox  GI ppx: not indicated  Diet: regular  Activity: activity as tolerated  Dispo: med

## 2025-03-15 ENCOUNTER — TRANSCRIPTION ENCOUNTER (OUTPATIENT)
Age: 20
End: 2025-03-15

## 2025-03-15 VITALS
DIASTOLIC BLOOD PRESSURE: 63 MMHG | HEART RATE: 67 BPM | SYSTOLIC BLOOD PRESSURE: 109 MMHG | TEMPERATURE: 97 F | RESPIRATION RATE: 18 BRPM

## 2025-03-15 PROCEDURE — 71045 X-RAY EXAM CHEST 1 VIEW: CPT | Mod: 26

## 2025-03-15 RX ADMIN — SODIUM CHLORIDE 110 MILLILITER(S): 9 INJECTION, SOLUTION INTRAVENOUS at 00:03

## 2025-03-15 RX ADMIN — Medication 1 APPLICATION(S): at 05:32

## 2025-03-15 NOTE — DISCHARGE NOTE NURSING/CASE MANAGEMENT/SOCIAL WORK - PATIENT PORTAL LINK FT
You can access the FollowMyHealth Patient Portal offered by [facility] by registering at the following website: [email or web address]. By joining [facility]’s FollowMyHealth portal, you will also be able to view your health information using other applications (apps) compatible with our system.

## 2025-03-15 NOTE — DISCHARGE NOTE PROVIDER - HOSPITAL COURSE
19-year-old male with no PMHx, presents today for left-sided chest pain, left neck pain, shortness of breath on exertion since 4 PM.  Patient states he woke up from his nap with sweating and felt the symptoms.  Patient states he works out every day, and  had his routine chest workout yesterday, he reported that he takes 200 Mg caffeine preworkout C4.  Patient denies headache, fevers, chills, nausea, vomiting.     found to have spontaneous L pneumothorax in ED, s/p L-sided pigtail placement by ED. Thoracic surgery consulted for pigtail management while admitted. Pnuemothorax decreased with pigtail, therefore it was removed once resolved on xray. Patient denies any shortness of breath or chest pain.     Due to the nature of the pneumothorax (spontaneous) we recommended a L VATS. Initially added on to the OR, however OR list with multiple emergent cases, patient instructed to come back as an outpatient. Follow-up on tuesday 3/18 in the office to schedule OR.

## 2025-03-15 NOTE — PROGRESS NOTE ADULT - SUBJECTIVE AND OBJECTIVE BOX
24H events:    Patient is a 19y old Male who presents with a chief complaint of Chest pain (13 Mar 2025 03:19)    Primary diagnosis of Pneumothorax  Today is hospital day 2d. This morning patient was seen and examined at bedside, resting comfortably in bed.    No acute or major events overnight. Some chest pain    Code Status: Full    PAST MEDICAL & SURGICAL HISTORY  Suppurative otitis media of right ear, unspecified chronicity  PREV EUSTACHIAN TUBE    History of surgery  TONSILLECTOMY'  EUSTACHIAN TUBE      SOCIAL HISTORY:  Social History:  No smoking, No alcohol, No drug use. (12 Mar 2025 00:02)      ALLERGIES:  No Known Allergies    MEDICATIONS:  STANDING MEDICATIONS  enoxaparin Injectable 40 milliGRAM(s) SubCutaneous every 24 hours  influenza   Vaccine 0.5 milliLiter(s) IntraMuscular once    PRN MEDICATIONS  acetaminophen     Tablet .. 650 milliGRAM(s) Oral every 6 hours PRN  aluminum hydroxide/magnesium hydroxide/simethicone Suspension 30 milliLiter(s) Oral every 4 hours PRN  ketorolac   Injectable 15 milliGRAM(s) IV Push every 6 hours PRN  melatonin 3 milliGRAM(s) Oral at bedtime PRN  ondansetron Injectable 4 milliGRAM(s) IV Push every 8 hours PRN    VITALS:   T(F): 97.4  HR: 60  BP: 104/61  RR: 18  SpO2: 97%    PHYSICAL EXAM:  GENERAL:   ( x ) NAD, lying in bed comfortably     (  ) obtunded     (  ) lethargic     (  ) somnolent    HEAD:   ( x ) Atraumatic     (  ) hematoma     (  ) laceration (specify location:       )     NECK:  (x  ) Supple     (  ) neck stiffness     (  ) nuchal rigidity     (  )  no JVD     (  ) JVD present ( -- cm)    HEART:  Rate -->     ( x ) normal rate     (  ) bradycardic     (  ) tachycardic  Rhythm -->     (x  ) regular     (  ) regularly irregular     (  ) irregularly irregular  Murmurs -->     (x  ) normal s1s2     (  ) systolic murmur     (  ) diastolic murmur     (  ) continuous murmur      (  ) S3 present     (  ) S4 present    LUNGS:   x(  )Unlabored respirations     (  ) tachypnea  (x  ) B/L air entry     (  ) decreased breath sounds in:  (location     )    ( x ) no adventitious sound     (  ) crackles     (  ) wheezing      (  ) rhonchi      (specify location:       )  (  ) chest wall tenderness (specify location:       )  (x) Left chest tube in place    ABDOMEN:   ( x ) Soft     (  ) tense   |   ( x ) nondistended     (  ) distended   |   (  x) +BS     (  ) hypoactive bowel sounds     (  ) hyperactive bowel sounds  (x  ) nontender     (  ) RUQ tenderness     (  ) RLQ tenderness     (  ) LLQ tenderness     (  ) epigastric tenderness     (  ) diffuse tenderness  (  ) Splenomegaly      (  ) Hepatomegaly      (  ) Jaundice     (  ) ecchymosis     EXTREMITIES:  (  x) Normal     (  ) Rash     (  ) ecchymosis     (  ) varicose veins      (  ) pitting edema     (  ) non-pitting edema   (  ) ulceration     (  ) gangrene:     (location:     )    NERVOUS SYSTEM:    (x  ) A&Ox3     (  ) confused     (  ) lethargic  CN II-XII:     (  x) Intact     (  ) deficits found     (Specify:     )   Upper extremities:     (  x) no sensorimotor deficits     (  ) weakness     (  ) loss of proprioception/vibration     (  ) loss of touch/temperature (specify:    )  Lower extremities:     ( x ) no sensorimotor deficits     (  ) weakness     (  ) loss of proprioception/vibration     (  ) loss of touch/temperature (specify:    )    LABS:                        14.5   6.27  )-----------( 206      ( 12 Mar 2025 07:21 )             44.0     03-12    142  |  105  |  15  ----------------------------<  105[H]  3.9   |  28  |  1.0    Ca    9.7      12 Mar 2025 07:21    TPro  6.8  /  Alb  4.6  /  TBili  0.3  /  DBili  x   /  AST  17  /  ALT  10  /  AlkPhos  67  03-12    PT/INR - ( 12 Mar 2025 07:21 )   PT: 11.90 sec;   INR: 1.01 ratio         PTT - ( 12 Mar 2025 07:21 )  PTT:30.9 sec  Urinalysis Basic - ( 12 Mar 2025 07:21 )    Color: x / Appearance: x / SG: x / pH: x  Gluc: 105 mg/dL / Ketone: x  / Bili: x / Urobili: x   Blood: x / Protein: x / Nitrite: x   Leuk Esterase: x / RBC: x / WBC x   Sq Epi: x / Non Sq Epi: x / Bacteria: x                RADIOLOGY:          
GENERAL SURGERY PROGRESS NOTE    Patient: RUPINDER JIMENEZ , 19y ([birth year])Male   MRN: [id number]  Location: [address]  Visit: 03-11-25 Inpatient  Date: 03-15-25 @ 05:38    Hospital Day #: 5    Procedure/Dx/Injuries:  SPONTANEOUS PTX, PIGTAIL PLACED BY ED    Procedure/Dx/Injuries: spontaneous left pneumothorax, s/p pigtail placement     Events of past 24 hours: Patient seen and examined at bedside. Left pigtail was removed. Patient denies any shortness of breath or chest pain. Added on to the OR schedule for 3/15. NPO with IVF.    PHYSICAL EXAM:  GENERAL: NAD, well-appearing  CHEST/LUNG: Clear to auscultation bilaterally  HEART: Regular rate and rhythm  ABDOMEN: Soft, Nontender, Nondistended  EXTREMITIES:  No clubbing, cyanosis, or edema    PAST MEDICAL & SURGICAL HISTORY:  Suppurative otitis media of right ear, unspecified chronicity  PREV EUSTACHIAN TUBE      History of surgery  TONSILLECTOMY'  EUSTACHIAN TUBE          Vitals:   T(F): 98.1 (03-15-25 @ 00:35), Max: 98.1 (03-15-25 @ 00:35)  HR: 78 (03-15-25 @ 00:35)  BP: 112/60 (03-15-25 @ 00:35)  RR: 18 (03-15-25 @ 00:35)  SpO2: 97% (03-15-25 @ 00:35)      Diet, NPO after Midnight:      NPO Start Date: 14-Mar-2025,   NPO Start Time: 23:59  Diet, Regular      Fluids: lactated ringers.: Solution, 1000 milliLiter(s) infuse at 110 mL/Hr      I & O's:      MEDICATIONS  (STANDING):  chlorhexidine 2% Cloths 1 Application(s) Topical <User Schedule>  enoxaparin Injectable 40 milliGRAM(s) SubCutaneous every 24 hours  influenza   Vaccine 0.5 milliLiter(s) IntraMuscular once  lactated ringers. 1000 milliLiter(s) (110 mL/Hr) IV Continuous <Continuous>    MEDICATIONS  (PRN):  acetaminophen     Tablet .. 650 milliGRAM(s) Oral every 6 hours PRN Temp greater or equal to 38C (100.4F), Mild Pain (1 - 3)  aluminum hydroxide/magnesium hydroxide/simethicone Suspension 30 milliLiter(s) Oral every 4 hours PRN Dyspepsia  ketorolac   Injectable 15 milliGRAM(s) IV Push every 6 hours PRN Severe Pain (7 - 10)  melatonin 3 milliGRAM(s) Oral at bedtime PRN Insomnia  ondansetron Injectable 4 milliGRAM(s) IV Push every 8 hours PRN Nausea and/or Vomiting      DVT PROPHYLAXIS: enoxaparin Injectable 40 milliGRAM(s) SubCutaneous every 24 hours    GI PROPHYLAXIS:   ANTICOAGULATION:   ANTIBIOTICS:            LAB/STUDIES:  Labs:  CAPILLARY BLOOD GLUCOSE                              13.8   6.95  )-----------( 206      ( 14 Mar 2025 20:00 )             41.2         03-14    141  |  105  |  20  ----------------------------<  92  4.1   |  28  |  0.9      Calcium: 9.7 mg/dL (03-14-25 @ 20:00)      LFTs:         Coags:     11.30  ----< 0.96    ( 14 Mar 2025 20:00 )     35.1        Urinalysis Basic - ( 14 Mar 2025 20:00 )    Color: x / Appearance: x / SG: x / pH: x  Gluc: 92 mg/dL / Ketone: x  / Bili: x / Urobili: x   Blood: x / Protein: x / Nitrite: x   Leuk Esterase: x / RBC: x / WBC x   Sq Epi: x / Non Sq Epi: x / Bacteria: x        ASSESSMENT:  19y M w/  no PMHx, presented for left-sided chest pain, left neck pain, shortness of breath on exertion found to have large left-sided pneumothorax with rightward mediastinal shift, s/p pigtail placement by ED,     PLAN:  - Added for the OR schedule.  - NPO with IVF  - AM CXR  - Monitor O2 sat  - Incentive spirometry  - SCDs  - DVT/GI prophylaxis    GREEN TEAM SPECTRA: [id number]
GENERAL SURGERY PROGRESS NOTE    Patient: RUPINDER JIMENEZ , 19y ([birth year])Male   MRN: [id number]  Location: [address]  Visit: 03-11-25 Inpatient  Date: 03-13-25 @ 03:20      Procedure/Dx/Injuries: spontaneous left pneumothorax, s/p pigtail placement     Events of past 24 hours: patient evaluated at bedside, denies any chest pain or SOB    PAST MEDICAL & SURGICAL HISTORY:  Suppurative otitis media of right ear, unspecified chronicity  PREV EUSTACHIAN TUBE      History of surgery  TONSILLECTOMY'  EUSTACHIAN TUBE          Vitals:   T(F): 97.5 (03-12-25 @ 21:10), Max: 97.6 (03-12-25 @ 04:50)  HR: 77 (03-12-25 @ 21:10)  BP: 137/75 (03-12-25 @ 21:10)  RR: 18 (03-12-25 @ 21:10)  SpO2: 97% (03-12-25 @ 21:10)      Diet, Regular      Fluids:     I & O's:    03-11-25 @ 07:01  -  03-12-25 @ 07:00  --------------------------------------------------------  IN:  Total IN: 0 mL    OUT:    Chest Tube (mL): 0 mL  Total OUT: 0 mL    Total NET: 0 mL        Bowel Movement: : [] YES [] NO  Flatus: : [] YES [] NO    PHYSICAL EXAM:  General: NAD, AAOx3, calm and cooperative  HEENT:  Neck supple  Cardiac: RRR S1, S2,   Respiratory: CTAB, left pigtail to suction, -airleak   Abdomen: Soft, non-distended, non-tender,    MEDICATIONS  (STANDING):  influenza   Vaccine 0.5 milliLiter(s) IntraMuscular once    MEDICATIONS  (PRN):  acetaminophen     Tablet .. 650 milliGRAM(s) Oral every 6 hours PRN Temp greater or equal to 38C (100.4F), Mild Pain (1 - 3)  aluminum hydroxide/magnesium hydroxide/simethicone Suspension 30 milliLiter(s) Oral every 4 hours PRN Dyspepsia  ibuprofen  Tablet. 400 milliGRAM(s) Oral every 6 hours PRN Moderate Pain (4 - 6)  melatonin 3 milliGRAM(s) Oral at bedtime PRN Insomnia  ondansetron Injectable 4 milliGRAM(s) IV Push every 8 hours PRN Nausea and/or Vomiting      DVT PROPHYLAXIS:   GI PROPHYLAXIS:   ANTICOAGULATION:   ANTIBIOTICS:        LAB/STUDIES:  Labs:  CAPILLARY BLOOD GLUCOSE                        14.5   6.27  )-----------( 206      ( 12 Mar 2025 07:21 )             44.0       Auto Immature Granulocyte %: 0.3 % (03-12-25 @ 07:21)    03-12    142  |  105  |  15  ----------------------------<  105[H]  3.9   |  28  |  1.0      Calcium: 9.7 mg/dL (03-12-25 @ 07:21)      LFTs:             6.8  | 0.3  | 17       ------------------[67      ( 12 Mar 2025 07:21 )  4.6  | x    | 10          Lipase:x      Amylase:x             Coags:     11.90  ----< 1.01    ( 12 Mar 2025 07:21 )     30.9        Urinalysis Basic - ( 12 Mar 2025 07:21 )    Color: x / Appearance: x / SG: x / pH: x  Gluc: 105 mg/dL / Ketone: x  / Bili: x / Urobili: x   Blood: x / Protein: x / Nitrite: x   Leuk Esterase: x / RBC: x / WBC x   Sq Epi: x / Non Sq Epi: x / Bacteria: x      IMAGING:  < from: Xray Chest 2 Views PA/Lat (03.11.25 @ 18:06) >    IMPRESSION:    Moderate to large left-sided pneumothorax. Rightward mediastinal shift.    Preliminary findings were discussed with Dr. Xavier on 3/11/2025 at 7:08   PM by Radiology Resident Dr. Grider.    --- End of Report ---    < end of copied text >  < from: CT Chest No Cont (03.12.25 @ 07:44) >    IMPRESSION:    Tiny residual left apical pneumothorax post left pleural pigtail catheter   placement.    A left apical 1.2 cm bleb is noted.    --- End of Report ---      < end of copied text >  < from: Xray Chest 1 View- PORTABLE-Urgent (Xray Chest 1 View- PORTABLE-Urgent .) (03.12.25 @ 12:11) >      IMPRESSION:    Tiny residual left apical pneumothorax with left pleural pigtail catheter   in situ.    --- End of Report     < end of copied text >          
GENERAL SURGERY PROGRESS NOTE    Patient: RUPINDER JIMENEZ , 19y ([birth year])Male   MRN: [id number]  Location: [address]  Visit: 03-11-25 Inpatient  Date: 03-14-25 @ 08:14    Hospital Day #: 4    Procedure/Dx/Injuries: spontaneous left pneumothorax, s/p pigtail placement     Events of past 24 hours: Patient seen and examined at bedside. Left pigtail was removed. Will follow up post-pull CXR. Patient denies any shortness of breath or chest pain.     PAST MEDICAL & SURGICAL HISTORY:  Suppurative otitis media of right ear, unspecified chronicity  PREV EUSTACHIAN TUBE    History of surgery  TONSILLECTOMY'  EUSTACHIAN TUBE    Vitals:   T(F): 97.9 (03-14-25 @ 00:39), Max: 98 (03-13-25 @ 20:09)  HR: 68 (03-14-25 @ 00:39)  BP: 129/67 (03-14-25 @ 00:39)  RR: 18 (03-14-25 @ 00:39)  SpO2: 97% (03-13-25 @ 20:09)    Diet, Regular    Fluids:     I & O's:    PHYSICAL EXAM:  GENERAL: NAD, well-appearing  CHEST/LUNG: Clear to auscultation bilaterally  HEART: Regular rate and rhythm  ABDOMEN: Soft, Nontender, Nondistended  EXTREMITIES:  No clubbing, cyanosis, or edema    MEDICATIONS  (STANDING):  enoxaparin Injectable 40 milliGRAM(s) SubCutaneous every 24 hours  influenza   Vaccine 0.5 milliLiter(s) IntraMuscular once    MEDICATIONS  (PRN):  acetaminophen     Tablet .. 650 milliGRAM(s) Oral every 6 hours PRN Temp greater or equal to 38C (100.4F), Mild Pain (1 - 3)  aluminum hydroxide/magnesium hydroxide/simethicone Suspension 30 milliLiter(s) Oral every 4 hours PRN Dyspepsia  ketorolac   Injectable 15 milliGRAM(s) IV Push every 6 hours PRN Severe Pain (7 - 10)  melatonin 3 milliGRAM(s) Oral at bedtime PRN Insomnia  ondansetron Injectable 4 milliGRAM(s) IV Push every 8 hours PRN Nausea and/or Vomiting    DVT PROPHYLAXIS: enoxaparin Injectable 40 milliGRAM(s) SubCutaneous every 24 hours    GI PROPHYLAXIS:   ANTICOAGULATION:   ANTIBIOTICS:      LAB/STUDIES:  Labs:  CAPILLARY BLOOD GLUCOSE                        14.3   6.55  )-----------( 193      ( 14 Mar 2025 00:08 )             43.8       Auto Immature Granulocyte %: 0.2 % (03-14-25 @ 00:08)    03-14    141  |  105  |  19  ----------------------------<  92  4.1   |  28  |  1.0      Calcium: 9.8 mg/dL (03-14-25 @ 00:08)      LFTs:      Coags:      Urinalysis Basic - ( 14 Mar 2025 00:08 )    Color: x / Appearance: x / SG: x / pH: x  Gluc: 92 mg/dL / Ketone: x  / Bili: x / Urobili: x   Blood: x / Protein: x / Nitrite: x   Leuk Esterase: x / RBC: x / WBC x   Sq Epi: x / Non Sq Epi: x / Bacteria: x      
RUPINDER JIMENEZ  19y, Male  Allergy: No Known Allergies    Hospital Day: 3d    Patient seen and examined earlier today.  No acute events overnight.     PMH/PSH:  PAST MEDICAL & SURGICAL HISTORY:  Suppurative otitis media of right ear, unspecified chronicity  PREV EUSTACHIAN TUBE      History of surgery  TONSILLECTOMY'  EUSTACHIAN TUBE          LAST 24-Hr EVENTS:    VITALS:  T(F): 97.9 (03-14-25 @ 16:12), Max: 98 (03-13-25 @ 20:09)  HR: 69 (03-14-25 @ 16:12)  BP: 105/64 (03-14-25 @ 16:12) (105/64 - 145/68)  RR: 19 (03-14-25 @ 16:12)  SpO2: 100% (03-14-25 @ 16:12)          TESTS & MEASUREMENTS:  Weight/BMI  71.7 (03-11-25 @ 17:01)  22.7 (03-11-25 @ 23:30)    03-12-25 @ 07:01  -  03-13-25 @ 07:00  --------------------------------------------------------  IN: 0 mL / OUT: 0 mL / NET: 0 mL                            14.3   6.55  )-----------( 193      ( 14 Mar 2025 00:08 )             43.8     PT/INR - ( 14 Mar 2025 11:11 )   PT: 11.60 sec;   INR: 0.98 ratio         PTT - ( 14 Mar 2025 11:11 )  PTT:31.7 sec  INR: 0.98 ratio (03-14-25 @ 11:11)  INR: 1.01 ratio (03-12-25 @ 07:21)    03-14    141  |  105  |  19  ----------------------------<  92  4.1   |  28  |  1.0    Ca    9.8      14 Mar 2025 00:08  Phos  3.8     03-14  Mg     2.0     03-14              Urinalysis Basic - ( 14 Mar 2025 00:08 )    Color: x / Appearance: x / SG: x / pH: x  Gluc: 92 mg/dL / Ketone: x  / Bili: x / Urobili: x   Blood: x / Protein: x / Nitrite: x   Leuk Esterase: x / RBC: x / WBC x   Sq Epi: x / Non Sq Epi: x / Bacteria: x                            RADIOLOGY, ECG, & ADDITIONAL TESTS:  12 Lead ECG:   Ventricular Rate 85 BPM    Atrial Rate 85 BPM    P-R Interval 132 ms    QRS Duration 102 ms    Q-T Interval 378 ms    QTC Calculation(Bazett) 449 ms    P Axis 62 degrees    R Axis 56 degrees    T Axis 40 degrees    Diagnosis Line Normal sinus rhythm  Normal ECG    Confirmed by KYRIE SANABRIA MDFIM ([id number]) on 3/12/2025 10:56:33 PM (03-12-25 @ 09:08)    CT Chest No Cont:   ACC: [id number] EXAM:  CT CHEST   ORDERED BY: MARINA PAULSON     PROCEDURE DATE:  03/12/2025          INTERPRETATION:  CLINICAL HISTORY / REASON FOR EXAM: Pneumothorax..    TECHNIQUE: Multislice helical sections were obtained from the thoracic   inletto the lung bases without contrast administration. Coronal and   sagittal images have been submitted for evaluation. Axial MIP images of   the chest were obtained.    This CT study was performed using radiation dose reduction software that   reduces mA or kVp according to the patient's size to obtain diagnostic   image quality with patient exposure as low as reasonably achievable.    COMPARISON: Chest radiographs 3/11/2025..    FINDINGS:    LUNGS, PLEURA, AIRWAYS: Post placement of left chest pigtail catheter   there is near-resolution of left pneumothorax with only a tiny residual   apical pneumothorax remaining. A left apical 1.2 cm bleb is noted (series   4, image 27). There is left lower lobe dependent atelectasis. Right lung   is clear. No pulmonary mass or effusion. No evidence of central   endobronchial obstruction. No bronchiectasis or honeycombing.    PULMONARY NODULES: No suspicious nodules are seen.    THORACIC NODES: No mediastinal, hilar, supraclavicular, or axillary   lymphadenopathy.    MEDIASTINUM/GREAT VESSELS: No pericardial effusion. Heart size is within   normal limits. The aorta and main pulmonary artery are of normal caliber.    BONES/SOFT TISSUES: Left chest wall subcutaneous emphysema.    VISUALIZED UPPER ABDOMEN: Unremarkable.    TUBES/LINES: Post placement of left chest pigtail catheter.    IMPRESSION:    Tiny residual left apical pneumothorax post left pleural pigtail catheter   placement.    A left apical 1.2 cm bleb is noted.    --- End of Report ---            DAVID DAWN MD; Attending Radiologist  This document has been electronically signed. Mar 12 2025  9:08PM (03-12-25 @ 07:44)    RECENT DIAGNOSTIC ORDERS:  Xray Chest 1 View- PORTABLE-Routine: AM   Indication: L pneumothorax  Transport: Portable (03-14-25 @ 15:26)  Prothrombin Time and INR, Plasma:  Start Date:14-Mar-2025. 20:00 (03-14-25 @ 15:24)  Phosphorus: 20:00 (03-14-25 @ 15:24)  Magnesium: 20:00 (03-14-25 @ 15:24)  Complete Blood Count: 20:00 (03-14-25 @ 15:24)  Basic Metabolic Panel: 20:00 (03-14-25 @ 15:24)  Activated Partial Thromboplastin Time:  Start Date:14-Mar-2025. 20:00 (03-14-25 @ 15:24)  Diet, NPO after Midnight:      NPO Start Date: 14-Mar-2025,   NPO Start Time: 23:59 (03-14-25 @ 14:25)  Diet, Regular (03-14-25 @ 14:25)  Xray Chest 1 View- PORTABLE-Urgent: Urgent   Indication: s/p left pigtail removal  Transport: Portable  Exam Completed (03-14-25 @ 11:36)      MEDICATIONS:  MEDICATIONS  (STANDING):  chlorhexidine 2% Cloths 1 Application(s) Topical <User Schedule>  enoxaparin Injectable 40 milliGRAM(s) SubCutaneous every 24 hours  influenza   Vaccine 0.5 milliLiter(s) IntraMuscular once    MEDICATIONS  (PRN):  acetaminophen     Tablet .. 650 milliGRAM(s) Oral every 6 hours PRN Temp greater or equal to 38C (100.4F), Mild Pain (1 - 3)  aluminum hydroxide/magnesium hydroxide/simethicone Suspension 30 milliLiter(s) Oral every 4 hours PRN Dyspepsia  ketorolac   Injectable 15 milliGRAM(s) IV Push every 6 hours PRN Severe Pain (7 - 10)  melatonin 3 milliGRAM(s) Oral at bedtime PRN Insomnia  ondansetron Injectable 4 milliGRAM(s) IV Push every 8 hours PRN Nausea and/or Vomiting      HOME MEDICATIONS:      PHYSICAL EXAM:  GENERAL: NAD  HEAD:  Atraumatic, Normocephalic  EYES: conjunctiva and sclera clear  NECK: Supple  CHEST/LUNG: Non labored respirations, CTAB Left chest wall dressing in place c/d/i   HEART: regular rate and rhythm   ABDOMEN: Soft, Nondistended   EXTREMITIES:  No clubbing, cyanosis, or edema  PSYCH: AAOx3  NEUROLOGY: non-focal  SKIN: No rashes or lesions

## 2025-03-15 NOTE — DISCHARGE NOTE PROVIDER - NSDCFUADDINST_GEN_ALL_CORE_FT
No contact sports, no smoking, no activities with increased risk for blunt force trauma (avoid motorcycle)

## 2025-03-15 NOTE — DISCHARGE NOTE NURSING/CASE MANAGEMENT/SOCIAL WORK - FINANCIAL ASSISTANCE
[facility] provides services at a reduced cost to those who are determined to be eligible through [facility]’s financial assistance program. Information regarding [facility]’s financial assistance program can be found by going to [email or web address] or by calling [phone].

## 2025-03-15 NOTE — DISCHARGE NOTE PROVIDER - NSDCCPCAREPLAN_GEN_ALL_CORE_FT
PRINCIPAL DISCHARGE DIAGNOSIS  Diagnosis: Pneumothorax  Assessment and Plan of Treatment: SURGERY DISCHARGE INSTRUCTIONS  FOLLOW-UP - with Dr. Etienne Lorenzana on Tuesday 3/18. Call the office to make an appointment or if you have any questions/concerns.  DIET - regular.   ACTIVITY- No contact sport or any activities that increased risk of blunt force trauma. Advised specially to avoid the use of his motorcycle. No heavy lifting for 4-6 wks over 10-20 lbs. Walking is encouraged. No running or swimming. No driving while taking pain medication.  WOUNDCARE - Some drainage from your incisions or drain sites is normal. If you have drainage from an open incision or drain site- cover loosely with sterile gauze and tape and change daily. May shower. Pat area dry when wet, keep clean and dry. Do not apply powders or lotion to wound area.   PAIN MEDS - Take over the counter extra strength tylenol 650mg and/or ibuprofen 400mg with food every 6 hours for pain. No more than 4g of tylenol in 24hrs or 1g in 4 hrs.   OTHER MEDS - If you have any questions about your other regular home medications please call your primary care physician or the physician who prescribed those medications to you.   If you develop fever, dizziness, chest pain, trouble breathing, nausea, vomiting, increasing abdominal pain, inability to pass bowel movements, redness/pain/discharge from incisions. Please call the office or go to the emergency room immediately.        SECONDARY DISCHARGE DIAGNOSES  Diagnosis: H/O chest tube placement  Assessment and Plan of Treatment:

## 2025-03-15 NOTE — DISCHARGE NOTE PROVIDER - CARE PROVIDER_API CALL
Clayton Abrams  Thoracic and Cardiac Surgery  [address] [place] [address], [address]  [place] [address]  Phone: [phone]  Fax: [phone]  Follow Up Time: 2 weeks

## 2025-03-18 ENCOUNTER — APPOINTMENT (OUTPATIENT)
Dept: CARDIOTHORACIC SURGERY | Facility: CLINIC | Age: 20
End: 2025-03-18

## 2025-03-18 DIAGNOSIS — J93.83 OTHER PNEUMOTHORAX: ICD-10-CM

## 2025-03-18 DIAGNOSIS — G47.00 INSOMNIA, UNSPECIFIED: ICD-10-CM

## 2025-03-18 DIAGNOSIS — Z82.49 FAMILY HISTORY OF ISCHEMIC HEART DISEASE AND OTHER DISEASES OF THE CIRCULATORY SYSTEM: ICD-10-CM

## 2025-03-18 DIAGNOSIS — Z83.3 FAMILY HISTORY OF DIABETES MELLITUS: ICD-10-CM

## 2025-03-20 PROBLEM — J93.9 PNEUMOTHORAX, UNSPECIFIED TYPE: Status: ACTIVE | Noted: 2025-03-20

## 2025-03-21 ENCOUNTER — RESULT REVIEW (OUTPATIENT)
Age: 20
End: 2025-03-21

## 2025-03-21 ENCOUNTER — NON-APPOINTMENT (OUTPATIENT)
Age: 20
End: 2025-03-21

## 2025-03-21 ENCOUNTER — APPOINTMENT (OUTPATIENT)
Dept: CARDIOTHORACIC SURGERY | Facility: CLINIC | Age: 20
End: 2025-03-21
Payer: MEDICAID

## 2025-03-21 VITALS
HEIGHT: 69 IN | HEART RATE: 73 BPM | WEIGHT: 154 LBS | DIASTOLIC BLOOD PRESSURE: 81 MMHG | TEMPERATURE: 98.1 F | BODY MASS INDEX: 22.81 KG/M2 | OXYGEN SATURATION: 98 % | SYSTOLIC BLOOD PRESSURE: 118 MMHG | RESPIRATION RATE: 16 BRPM

## 2025-03-21 PROCEDURE — 99204 OFFICE O/P NEW MOD 45 MIN: CPT

## 2025-03-26 DIAGNOSIS — J93.9 PNEUMOTHORAX, UNSPECIFIED: ICD-10-CM

## 2025-03-26 DIAGNOSIS — Z00.00 ENCOUNTER FOR GENERAL ADULT MEDICAL EXAMINATION W/OUT ABNORMAL FINDINGS: ICD-10-CM

## 2025-04-05 PROBLEM — Z90.89 ACQUIRED ABSENCE OF OTHER ORGANS: Chronic | Status: ACTIVE | Noted: 2025-03-31

## 2025-04-05 PROBLEM — Z87.898 PERSONAL HISTORY OF OTHER SPECIFIED CONDITIONS: Chronic | Status: ACTIVE | Noted: 2025-03-31

## 2025-04-05 PROBLEM — J98.19 OTHER PULMONARY COLLAPSE: Chronic | Status: ACTIVE | Noted: 2025-03-31
